# Patient Record
Sex: FEMALE | Race: WHITE | NOT HISPANIC OR LATINO | Employment: STUDENT | URBAN - METROPOLITAN AREA
[De-identification: names, ages, dates, MRNs, and addresses within clinical notes are randomized per-mention and may not be internally consistent; named-entity substitution may affect disease eponyms.]

---
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
## 2023-08-20 NOTE — TELEPHONE ENCOUNTER
----- Message from Kathrin Christensen MD sent at 10/10/2019  9:59 AM CDT -----  Please notify urine culture grew a bacteria and the antibiotic she is on should treat this  If still having symptoms needs to repeat.  Also please get Toshia's number so we can call her as well to go over results  
Mom and patient advised, verb understnaidng  
Sun
